# Patient Record
Sex: FEMALE | Race: OTHER | NOT HISPANIC OR LATINO | URBAN - METROPOLITAN AREA
[De-identification: names, ages, dates, MRNs, and addresses within clinical notes are randomized per-mention and may not be internally consistent; named-entity substitution may affect disease eponyms.]

---

## 2019-10-21 ENCOUNTER — EMERGENCY (EMERGENCY)
Facility: HOSPITAL | Age: 34
LOS: 1 days | Discharge: HOME | End: 2019-10-21
Attending: EMERGENCY MEDICINE | Admitting: EMERGENCY MEDICINE
Payer: COMMERCIAL

## 2019-10-21 VITALS
SYSTOLIC BLOOD PRESSURE: 117 MMHG | HEART RATE: 87 BPM | DIASTOLIC BLOOD PRESSURE: 72 MMHG | OXYGEN SATURATION: 100 % | RESPIRATION RATE: 16 BRPM

## 2019-10-21 VITALS
SYSTOLIC BLOOD PRESSURE: 112 MMHG | DIASTOLIC BLOOD PRESSURE: 74 MMHG | RESPIRATION RATE: 16 BRPM | TEMPERATURE: 98 F | OXYGEN SATURATION: 100 % | HEART RATE: 75 BPM

## 2019-10-21 LAB
APPEARANCE UR: CLEAR — SIGNIFICANT CHANGE UP
BILIRUB UR-MCNC: NEGATIVE — SIGNIFICANT CHANGE UP
BLOOD UR QL VISUAL: NEGATIVE — SIGNIFICANT CHANGE UP
COLOR SPEC: SIGNIFICANT CHANGE UP
GLUCOSE UR-MCNC: NEGATIVE — SIGNIFICANT CHANGE UP
KETONES UR-MCNC: NEGATIVE — SIGNIFICANT CHANGE UP
LEUKOCYTE ESTERASE UR-ACNC: NEGATIVE — SIGNIFICANT CHANGE UP
NITRITE UR-MCNC: NEGATIVE — SIGNIFICANT CHANGE UP
PH UR: 6 — SIGNIFICANT CHANGE UP (ref 5–8)
PROT UR-MCNC: NEGATIVE — SIGNIFICANT CHANGE UP
SP GR SPEC: 1.01 — SIGNIFICANT CHANGE UP (ref 1–1.04)
UROBILINOGEN FLD QL: NORMAL — SIGNIFICANT CHANGE UP

## 2019-10-21 PROCEDURE — 71046 X-RAY EXAM CHEST 2 VIEWS: CPT | Mod: 26

## 2019-10-21 PROCEDURE — 99284 EMERGENCY DEPT VISIT MOD MDM: CPT

## 2019-10-21 RX ORDER — KETOROLAC TROMETHAMINE 30 MG/ML
30 SYRINGE (ML) INJECTION ONCE
Refills: 0 | Status: DISCONTINUED | OUTPATIENT
Start: 2019-10-21 | End: 2019-10-21

## 2019-10-21 RX ADMIN — Medication 30 MILLIGRAM(S): at 07:24

## 2019-10-21 NOTE — ED PROVIDER NOTE - OBJECTIVE STATEMENT
34yF with no significant pmh presents with intermittent achy non radiating L sided back pain of 3 day duration. Presented today due to worsening nature of pain last night. Mildly pleuritic and not associated with movement. No fever, chest pain, sob, abd pain, n/v, paresthesia, weakness numbness, urinary frequency, dysuria, hematuria or changes in BM.

## 2019-10-21 NOTE — ED PROVIDER NOTE - PHYSICAL EXAMINATION
Gen: AAOx3, non-toxic  Head: NCAT  HEENT: EOMI, oral mucosa moist, normal conjunctiva  Lung: CTAB, no respiratory distress, speaking in full sentences  CV: RRR, no murmurs, rubs or gallops  Abd: soft, NTND, no guarding, no CVA tenderness  MSK: no visible deformities, No midline ttp, L sided paraspinal ttp  Neuro: No focal sensory or motor deficits  Skin: Warm, well perfused, no rash  Psych: normal affect.   ~Randell Villagran M.D. Resident

## 2019-10-21 NOTE — ED ADULT NURSE REASSESSMENT NOTE - NS ED NURSE REASSESS COMMENT FT1
Pt received to rm 25.  Pt states she's feeling much better.  NAD.  Appears comfortable.  Denies N/V/D.

## 2019-10-21 NOTE — ED PROVIDER NOTE - NS ED ROS FT
GENERAL: No fever or chills, EYES: no change in vision, HEENT: no trouble swallowing or speaking, CARDIAC: no chest pain, PULMONARY: no cough or SOB, GI: no abdominal pain, no nausea, no vomiting, no diarrhea or constipation, : No changes in urination, SKIN: no rashes, NEURO: no headache,    MSK: L back pain ~Randell Villagran M.D. Resident

## 2019-10-21 NOTE — ED PROVIDER NOTE - ATTENDING CONTRIBUTION TO CARE
Seen and examined, 34F with atraumatic back pain, now with gradual worsening and inc. pain overnight. No assoc. fever/chills, no N/V, no urinary c/o, no recent exertion or injury, denies weakness/numbness. Clear lungs, heart reg, abd soft, NT to palp, NT spine, mild reproducible paraspinal tenderness, no edema, ambulates easiliy in ED.

## 2019-10-21 NOTE — ED PROVIDER NOTE - CLINICAL SUMMARY MEDICAL DECISION MAKING FREE TEXT BOX
34yF with no significant pmh presents with intermittent achy non radiating L sided back pain of 3 day duration reproducible on exam without any urinary complaints, paresthesia, weakness, or saddle anesthesia. Low index of suspicion for AAA, stone or torsion. Will check urine, CXR, pain management and reassess

## 2019-10-21 NOTE — ED ADULT NURSE NOTE - OBJECTIVE STATEMENT
Pt received to the ED coming in presenting with LUQ pain that began Sat, worsened earlier this morning. Pt a&ox4 and ambulatory at baseline, skin intact, respirations even and unlabored, abd soft and non-distended. Pt reports discomfort on left side of abd is sharp, constant, and unrelieved by Tylenol. Pt states " I felt nauseous earlier and vomited 3 times however I no longer feel nauseous.". Pt denies SOB, CP, dysuria, fever, or trauma to the left side. Awaiting MD fuller, will continue to monitor.

## 2019-10-21 NOTE — ED PROVIDER NOTE - PATIENT PORTAL LINK FT
You can access the FollowMyHealth Patient Portal offered by [facility] by registering at the following website: [email or web address]. By joining [facility]’s FollowMyHealth portal, you will also be able to view your health information using other applications (apps) compatible with our system.

## 2019-10-21 NOTE — ED PROVIDER NOTE - NSFOLLOWUPINSTRUCTIONS_ED_ALL_ED_FT
Please follow up with your primary care physician in 24-48 hours  A copy of your results have been provided to you  You can use heat intermittently and take motrin as needed for pain  Please come back if any of the following: Fever, numbness, tingling, worsening pain, blood in urine, chest pain, shortness of breath or any major concern

## 2019-10-21 NOTE — ED ADULT TRIAGE NOTE - CHIEF COMPLAINT QUOTE
pt c/o L side Rib/LUQ abd pain since Sat, worse with movement.  took Tylenol with no relief. also c/o 3 episodes of vomiting since 4am. denies nausea at present. no PMH/Medications.